# Patient Record
Sex: FEMALE | Race: WHITE | Employment: FULL TIME | URBAN - METROPOLITAN AREA
[De-identification: names, ages, dates, MRNs, and addresses within clinical notes are randomized per-mention and may not be internally consistent; named-entity substitution may affect disease eponyms.]

---

## 2016-12-05 LAB
ABO + RH BLD: NORMAL
ABO + RH BLD: NORMAL
BLD GP AB SCN SERPL QL: NORMAL
C TRACH DNA SPEC QL PROBE+SIG AMP: NEGATIVE
HBV SURFACE AG SERPL QL IA: NEGATIVE
HIV 1+2 AB+HIV1 P24 AG SERPL QL IA: NEGATIVE
N GONORRHOEA DNA SPEC QL PROBE+SIG AMP: NEGATIVE
PLATELET # BLD AUTO: 240 10^9/L
RUBELLA ANTIBODY IGG QUANTITATIVE: NORMAL IU/ML
T PALLIDUM IGG SER QL: NEGATIVE

## 2017-06-17 LAB — GROUP B STREP PCR: NORMAL

## 2017-07-12 ENCOUNTER — HOSPITAL ENCOUNTER (INPATIENT)
Facility: CLINIC | Age: 27
LOS: 2 days | Discharge: HOME | End: 2017-07-14
Attending: MIDWIFE | Admitting: MIDWIFE
Payer: COMMERCIAL

## 2017-07-12 LAB
ABO + RH BLD: NORMAL
ABO + RH BLD: NORMAL
BASOPHILS # BLD AUTO: 0 10E9/L (ref 0–0.2)
BASOPHILS NFR BLD AUTO: 0.3 %
DIFFERENTIAL METHOD BLD: ABNORMAL
EOSINOPHIL # BLD AUTO: 0.1 10E9/L (ref 0–0.7)
EOSINOPHIL NFR BLD AUTO: 0.7 %
ERYTHROCYTE [DISTWIDTH] IN BLOOD BY AUTOMATED COUNT: 13.4 % (ref 10–15)
HCT VFR BLD AUTO: 34.5 % (ref 35–47)
HGB BLD-MCNC: 11.1 G/DL (ref 11.7–15.7)
IMM GRANULOCYTES # BLD: 0 10E9/L (ref 0–0.4)
IMM GRANULOCYTES NFR BLD: 0.4 %
LYMPHOCYTES # BLD AUTO: 1.6 10E9/L (ref 0.8–5.3)
LYMPHOCYTES NFR BLD AUTO: 22.9 %
MCH RBC QN AUTO: 28.1 PG (ref 26.5–33)
MCHC RBC AUTO-ENTMCNC: 32.2 G/DL (ref 31.5–36.5)
MCV RBC AUTO: 87 FL (ref 78–100)
MONOCYTES # BLD AUTO: 0.6 10E9/L (ref 0–1.3)
MONOCYTES NFR BLD AUTO: 8.1 %
NEUTROPHILS # BLD AUTO: 4.9 10E9/L (ref 1.6–8.3)
NEUTROPHILS NFR BLD AUTO: 67.6 %
NRBC # BLD AUTO: 0 10*3/UL
NRBC BLD AUTO-RTO: 0 /100
PLATELET # BLD AUTO: 238 10E9/L (ref 150–450)
RBC # BLD AUTO: 3.95 10E12/L (ref 3.8–5.2)
SPECIMEN EXP DATE BLD: NORMAL
T PALLIDUM IGG+IGM SER QL: NEGATIVE
WBC # BLD AUTO: 7.2 10E9/L (ref 4–11)

## 2017-07-12 PROCEDURE — 85025 COMPLETE CBC W/AUTO DIFF WBC: CPT | Performed by: MIDWIFE

## 2017-07-12 PROCEDURE — 25000125 ZZHC RX 250: Performed by: MIDWIFE

## 2017-07-12 PROCEDURE — 12000037 ZZH R&B POSTPARTUM INTERMEDIATE

## 2017-07-12 PROCEDURE — 25000128 H RX IP 250 OP 636

## 2017-07-12 PROCEDURE — 72200001 ZZH LABOR CARE VAGINAL DELIVERY SINGLE

## 2017-07-12 PROCEDURE — 86901 BLOOD TYPING SEROLOGIC RH(D): CPT | Performed by: MIDWIFE

## 2017-07-12 PROCEDURE — 10907ZC DRAINAGE OF AMNIOTIC FLUID, THERAPEUTIC FROM PRODUCTS OF CONCEPTION, VIA NATURAL OR ARTIFICIAL OPENING: ICD-10-PCS | Performed by: MIDWIFE

## 2017-07-12 PROCEDURE — 25000132 ZZH RX MED GY IP 250 OP 250 PS 637: Performed by: MIDWIFE

## 2017-07-12 PROCEDURE — 86900 BLOOD TYPING SEROLOGIC ABO: CPT | Performed by: MIDWIFE

## 2017-07-12 PROCEDURE — 86780 TREPONEMA PALLIDUM: CPT | Performed by: MIDWIFE

## 2017-07-12 PROCEDURE — 25000128 H RX IP 250 OP 636: Performed by: MIDWIFE

## 2017-07-12 RX ORDER — IBUPROFEN 800 MG/1
800 TABLET, FILM COATED ORAL
Status: COMPLETED | OUTPATIENT
Start: 2017-07-12 | End: 2017-07-12

## 2017-07-12 RX ORDER — MISOPROSTOL 200 UG/1
400 TABLET ORAL
Status: DISCONTINUED | OUTPATIENT
Start: 2017-07-12 | End: 2017-07-14 | Stop reason: HOSPADM

## 2017-07-12 RX ORDER — ACETAMINOPHEN 325 MG/1
650 TABLET ORAL EVERY 4 HOURS PRN
Status: DISCONTINUED | OUTPATIENT
Start: 2017-07-12 | End: 2017-07-14 | Stop reason: HOSPADM

## 2017-07-12 RX ORDER — OXYTOCIN 10 [USP'U]/ML
10 INJECTION, SOLUTION INTRAMUSCULAR; INTRAVENOUS
Status: DISCONTINUED | OUTPATIENT
Start: 2017-07-12 | End: 2017-07-14 | Stop reason: HOSPADM

## 2017-07-12 RX ORDER — IBUPROFEN 400 MG/1
400-800 TABLET, FILM COATED ORAL EVERY 6 HOURS PRN
Status: DISCONTINUED | OUTPATIENT
Start: 2017-07-12 | End: 2017-07-14 | Stop reason: HOSPADM

## 2017-07-12 RX ORDER — OXYTOCIN/0.9 % SODIUM CHLORIDE 30/500 ML
340 PLASTIC BAG, INJECTION (ML) INTRAVENOUS CONTINUOUS PRN
Status: DISCONTINUED | OUTPATIENT
Start: 2017-07-12 | End: 2017-07-14 | Stop reason: HOSPADM

## 2017-07-12 RX ORDER — BISACODYL 10 MG
10 SUPPOSITORY, RECTAL RECTAL DAILY PRN
Status: DISCONTINUED | OUTPATIENT
Start: 2017-07-14 | End: 2017-07-14 | Stop reason: HOSPADM

## 2017-07-12 RX ORDER — CARBOPROST TROMETHAMINE 250 UG/ML
250 INJECTION, SOLUTION INTRAMUSCULAR
Status: DISCONTINUED | OUTPATIENT
Start: 2017-07-12 | End: 2017-07-12

## 2017-07-12 RX ORDER — OXYCODONE AND ACETAMINOPHEN 5; 325 MG/1; MG/1
1 TABLET ORAL
Status: DISCONTINUED | OUTPATIENT
Start: 2017-07-12 | End: 2017-07-12

## 2017-07-12 RX ORDER — PENICILLIN G POTASSIUM 5000000 [IU]/1
INJECTION, POWDER, FOR SOLUTION INTRAMUSCULAR; INTRAVENOUS
Status: COMPLETED
Start: 2017-07-12 | End: 2017-07-12

## 2017-07-12 RX ORDER — OXYTOCIN 10 [USP'U]/ML
10 INJECTION, SOLUTION INTRAMUSCULAR; INTRAVENOUS
Status: DISCONTINUED | OUTPATIENT
Start: 2017-07-12 | End: 2017-07-12

## 2017-07-12 RX ORDER — PENICILLIN G POTASSIUM 5000000 [IU]/1
5 INJECTION, POWDER, FOR SOLUTION INTRAMUSCULAR; INTRAVENOUS ONCE
Status: COMPLETED | OUTPATIENT
Start: 2017-07-12 | End: 2017-07-12

## 2017-07-12 RX ORDER — AMOXICILLIN 250 MG
1-2 CAPSULE ORAL 2 TIMES DAILY
Status: DISCONTINUED | OUTPATIENT
Start: 2017-07-12 | End: 2017-07-14 | Stop reason: HOSPADM

## 2017-07-12 RX ORDER — OXYTOCIN/0.9 % SODIUM CHLORIDE 30/500 ML
100 PLASTIC BAG, INJECTION (ML) INTRAVENOUS CONTINUOUS
Status: DISCONTINUED | OUTPATIENT
Start: 2017-07-12 | End: 2017-07-14 | Stop reason: HOSPADM

## 2017-07-12 RX ORDER — HYDROCORTISONE 2.5 %
CREAM (GRAM) TOPICAL 3 TIMES DAILY PRN
Status: DISCONTINUED | OUTPATIENT
Start: 2017-07-12 | End: 2017-07-14 | Stop reason: HOSPADM

## 2017-07-12 RX ORDER — METHYLERGONOVINE MALEATE 0.2 MG/ML
200 INJECTION INTRAVENOUS
Status: DISCONTINUED | OUTPATIENT
Start: 2017-07-12 | End: 2017-07-12

## 2017-07-12 RX ORDER — LANOLIN 100 %
OINTMENT (GRAM) TOPICAL
Status: DISCONTINUED | OUTPATIENT
Start: 2017-07-12 | End: 2017-07-14 | Stop reason: HOSPADM

## 2017-07-12 RX ORDER — OXYTOCIN/0.9 % SODIUM CHLORIDE 30/500 ML
100-340 PLASTIC BAG, INJECTION (ML) INTRAVENOUS CONTINUOUS PRN
Status: COMPLETED | OUTPATIENT
Start: 2017-07-12 | End: 2017-07-12

## 2017-07-12 RX ORDER — NALOXONE HYDROCHLORIDE 0.4 MG/ML
.1-.4 INJECTION, SOLUTION INTRAMUSCULAR; INTRAVENOUS; SUBCUTANEOUS
Status: DISCONTINUED | OUTPATIENT
Start: 2017-07-12 | End: 2017-07-14 | Stop reason: HOSPADM

## 2017-07-12 RX ORDER — SODIUM CHLORIDE, SODIUM LACTATE, POTASSIUM CHLORIDE, CALCIUM CHLORIDE 600; 310; 30; 20 MG/100ML; MG/100ML; MG/100ML; MG/100ML
INJECTION, SOLUTION INTRAVENOUS CONTINUOUS
Status: DISCONTINUED | OUTPATIENT
Start: 2017-07-12 | End: 2017-07-12

## 2017-07-12 RX ORDER — ACETAMINOPHEN 325 MG/1
650 TABLET ORAL EVERY 4 HOURS PRN
Status: DISCONTINUED | OUTPATIENT
Start: 2017-07-12 | End: 2017-07-12

## 2017-07-12 RX ORDER — ONDANSETRON 2 MG/ML
4 INJECTION INTRAMUSCULAR; INTRAVENOUS EVERY 6 HOURS PRN
Status: DISCONTINUED | OUTPATIENT
Start: 2017-07-12 | End: 2017-07-12

## 2017-07-12 RX ORDER — NALOXONE HYDROCHLORIDE 0.4 MG/ML
.1-.4 INJECTION, SOLUTION INTRAMUSCULAR; INTRAVENOUS; SUBCUTANEOUS
Status: DISCONTINUED | OUTPATIENT
Start: 2017-07-12 | End: 2017-07-12

## 2017-07-12 RX ORDER — FENTANYL CITRATE 50 UG/ML
50-100 INJECTION, SOLUTION INTRAMUSCULAR; INTRAVENOUS
Status: DISCONTINUED | OUTPATIENT
Start: 2017-07-12 | End: 2017-07-12

## 2017-07-12 RX ORDER — OXYCODONE AND ACETAMINOPHEN 5; 325 MG/1; MG/1
1 TABLET ORAL EVERY 4 HOURS PRN
Status: DISCONTINUED | OUTPATIENT
Start: 2017-07-12 | End: 2017-07-14 | Stop reason: HOSPADM

## 2017-07-12 RX ADMIN — ACETAMINOPHEN 650 MG: 325 TABLET, FILM COATED ORAL at 19:50

## 2017-07-12 RX ADMIN — PENICILLIN G POTASSIUM 5 MILLION UNITS: 5000000 POWDER, FOR SOLUTION INTRAMUSCULAR; INTRAPLEURAL; INTRATHECAL; INTRAVENOUS at 10:16

## 2017-07-12 RX ADMIN — Medication 340 ML/HR: at 18:36

## 2017-07-12 RX ADMIN — PENICILLIN G POTASSIUM 5 MILLION UNITS: 5000000 INJECTION, POWDER, FOR SOLUTION INTRAMUSCULAR; INTRAVENOUS at 10:16

## 2017-07-12 RX ADMIN — SENNOSIDES AND DOCUSATE SODIUM 1 TABLET: 8.6; 5 TABLET ORAL at 22:47

## 2017-07-12 RX ADMIN — IBUPROFEN 800 MG: 800 TABLET ORAL at 19:50

## 2017-07-12 RX ADMIN — SODIUM CHLORIDE, POTASSIUM CHLORIDE, SODIUM LACTATE AND CALCIUM CHLORIDE: 600; 310; 30; 20 INJECTION, SOLUTION INTRAVENOUS at 10:16

## 2017-07-12 RX ADMIN — SODIUM CHLORIDE 2.5 MILLION UNITS: 9 INJECTION, SOLUTION INTRAVENOUS at 14:00

## 2017-07-12 NOTE — PROGRESS NOTES
"OB Progress Note  2017  4:55 PM    S:  Not really feeling contractions, desires AROM.      O:  /73  Pulse 74  Temp 97.8  F (36.6  C) (Temporal)  Resp 16  Ht 1.651 m (5' 5\")  Wt 69.4 kg (153 lb)  BMI 25.46 kg/m2  EFM: baseline 145, accelerations pos, neg decelerations, mod variability; Category I  Aguilar:  Ctx q2-3 min, 60sec, palp mod  SVE:  8/80%/-1  Membranes: AROM clear fluid    A/P:  27 year old  @39w6d admitted with advanced dilation, active labor, GBS pos, CAT I  1.  AROM clear fluid  2.  Adequately tx for GBS  3.  Anticipate     Lola Fam CNM    "

## 2017-07-12 NOTE — PLAN OF CARE
Pt ambulated s difficulty to room 229  et side. Pt continues to deny any pain. Pt is off the monitor at this time. . Pt orientated to room denies any questions.

## 2017-07-12 NOTE — OP NOTE
OB Delivery Summary    1.  with  IUP @ 39w6d  2.  Pregnancy complications- GBS pos, normal course  3.  Labor- admitted with advanced dilation @ /-1, plan included AROM after being adequately treated for GBS. Membranes had been swept in clinic before admit, pt contracted irregularly from about 1100 until AROM at 1700. SVE at AROM 8/-1  4   Analgesia none  5.  Fetal heart tones-140 baseline, mod variability, early decelerations with pushing, return to baseline after contraction.  6.  Labor interventions- IV antibiotics for GBS x 2.  7.  Membranes-AROM @ 1700 on 17  8.   Infant- viable, vigorous male, vertex delivered at 1824 on 2017, Apgars 8 and 8 at one and five minutes.  Weight 9 pounds 11oz   9.   Placenta- delivered spontaneously at 1834, in tact with 3V cord   10.  Lacerations- very superficial lac at perineum, hemostatic, no sutures needed  11.  Complications- none  12.  EBL- 250cc  13.  Labor course:  1st stage one hour 17 min                                  2nd stage 7min                                   3rd stage 10min    Lola Fam  2017  6:50 PM

## 2017-07-12 NOTE — PROGRESS NOTES
Patient presents to [facility] with direct admit for advanced dilation and GBS+ status. Patient has a history of fast labors. She is currently having contractions, but not feeling them. +FM, Category one strip with accelerations of 15x15 and no decelerations.

## 2017-07-12 NOTE — IP AVS SNAPSHOT
[facility]    [address] [place] [address], [address]    [facility] [address]    Phone:  [phone]                                       After Visit Summary   7/12/2017    Анна Gore    MRN: [id number]           After Visit Summary Signature Page     I have received my discharge instructions, and my questions have been answered. I have discussed any challenges I see with this plan with the nurse or doctor.    ..........................................................................................................................................  Patient/Patient Representative Signature      ..........................................................................................................................................  Patient Representative Print Name and Relationship to Patient    ..................................................               ................................................  Date                                            Time    ..........................................................................................................................................  Reviewed by Signature/Title    ...................................................              ..............................................  Date                                                            Time

## 2017-07-12 NOTE — H&P
"OB Brief Admit H&P    No significant change in general health status based on examination of the patient, review of Nursing Admission Database and prenatal record.    Pt is a 27 year old  @ 39w6d who presented to L&D with advanced dilation from clinic. Pos FM, denies SROM, reports irregular contractions    Patient's prenatal course has been complicated by GBS pos, otherwise normal prenatal course. Hx of rapid delivery with first baby    Prenatal Labs:    Blood type O+  Rubella pending  GCT WNL  GBS pos    EFW: 7 1/2lbs    Resp 18  Ht 1.651 m (5' 5\")  Wt 69.4 kg (153 lb)  BMI 25.46 kg/m2  EFM:  155  [facility]: irregular contraction  SVE: 7/70%/-1  Membranes:  intact    Assessment:  27 year old  @ 39w6d admitted for advanced dilation, CAT I, GBS pos    Plan:  1. Admit to labor and delivery   2. Penicillin per protocol for GBS+  3. Plan to AROM after adequate tx for GBS  4 Mtahew Fam  2017  11:57 AM      "

## 2017-07-12 NOTE — IP AVS SNAPSHOT
MRN:[id number]                      After Visit Summary   7/12/2017    Анна Gore    MRN: [id number]           Thank you!     Thank you for choosing [facility] for your care. Our goal is always to provide you with excellent care. Hearing back from our patients is one way we can continue to improve our services. Please take a few minutes to complete the written survey that you may receive in the mail after you visit with us. Thank you!        Patient Information     Date Of Birth          1990        About your hospital stay     You were admitted on:  July 12, 2017 You last received care in the:   [facility]    You were discharged on:  July 14, 2017       Who to Call     For medical emergencies, please call 911.  For non-urgent questions about your medical care, please call your primary care provider or clinic, [phone]          Attending Provider     Provider Specialty    Lola Fam CNM [facility]       Primary Care Provider Office Phone # Fax #    Du Christian [phone]      After Care Instructions     Activity       Review discharge instructions            Diet       Resume previous diet            Discharge Instructions - Postpartum visit       Schedule postpartum visit with your provider and return to clinic in 6 weeks.                  Further instructions from your care team       Postpartum Vaginal Delivery Instructions    Activity       Ask family and friends for help when you need it.    Do not place anything in your vagina for 6 weeks.    You are not restricted on other activities, but take it easy for a few weeks to allow your body to recover from delivery.  You are able to do any activities you feel up to that point.    No driving until you have stopped taking your pain medications (usually two weeks after delivery).     Call your health care provider if you have any of these symptoms:       Increased pain, swelling, redness, or fluid around  "your stiches from an episiotomy or perineal tear.    A fever above 100.4 F (38 C) with or without chills when placing a thermometer under your tongue.    You soak a sanitary pad with blood within 1 hour, or you see blood clots larger than a golf ball.    Bleeding that lasts more than 6 weeks.    Vaginal discharge that smells bad.    Severe pain, cramping or tenderness in your lower belly area.    A need to urinate more frequently (use the toilet more often), more urgently (use the toilet very quickly), or it burns when you urinate.    Nausea and vomiting.    Redness, swelling or pain around a vein in your leg.    Problems breastfeeding or a red or painful area on your breast.    Chest pain and cough or are gasping for air.    Problems coping with sadness, anxiety, or depression.  If you have any concerns about hurting yourself or the baby, call your provider immediately.     You have questions or concerns after you return home.     Keep your hands clean:  Always wash your hands before touching your perineal area and stitches.  This helps reduce your risk of infection.  If your hands aren't dirty, you may use an alcohol hand-rub to clean your hands. Keep your nails clean and short.        Pending Results     No orders found from 7/10/2017 to 7/13/2017.            Statement of Approval     Ordered          07/14/17 0815  I have reviewed and agree with all the recommendations and orders detailed in this document.  EFFECTIVE NOW     Approved and electronically signed by:  Tess Guadalupe APRN CNM             Admission Information     Date & Time Provider Department Dept. Phone    7/12/2017 Lola Fam CNM  [facility] [phone]      Your Vitals Were     Blood Pressure Pulse Temperature Respirations Height Weight    117/72 71 98.2  F (36.8  C) (Oral) 18 1.651 m (5' 5\") 69.4 kg (153 lb)    BMI (Body Mass Index)                   25.46 kg/m2           MyChart Information     [employer] gives you secure " access to your electronic health record. If you see a primary care provider, you can also send messages to your care team and make appointments. If you have questions, please call your primary care clinic.  If you do not have a primary care provider, please call [phone] and they will assist you.        Care EveryWhere ID     This is your Care EveryWhere ID. This could be used by other organizations to access your [facility] medical records  [id number]        Equal Access to Services     JENNYFER PATTEN : Hadii darrin york hadasho Soomaali, waaxda luqadaha, qaybta kaalmada adeegyada, taniya royal. So [facility] [phone].    ATENCIÓN: Si anselmo jerry, tiene a lanier disposición servicios gratuitos de asistencia lingüística. Llame al [phone].    We comply with applicable federal civil rights laws and [place] laws. We do not discriminate on the basis of race, color, national origin, age, disability sex, sexual orientation or gender identity.               Review of your medicines      START taking        Dose / Directions    breast pump Misc        Electric pump   Quantity:  1 each   Refills:  0       ibuprofen 200 MG tablet   Commonly known as:  ADVIL/MOTRIN        Dose:  600 mg   Take 3 tablets (600 mg) by mouth every 6 hours as needed for other (cramping)   Refills:  0            Where to get your medicines      Some of these will need a paper prescription and others can be bought over the counter. Ask your nurse if you have questions.     Bring a paper prescription for each of these medications     breast pump Misc       You don't need a prescription for these medications     ibuprofen 200 MG tablet                Protect others around you: Learn how to safely use, store and throw away your medicines at www.disposemymeds.org.             Medication List: This is a list of all your medications and when to take them. Check marks below indicate your daily home schedule. Keep this list as a  reference.      Medications           Morning Afternoon Evening Bedtime As Needed    breast pump Misc   Electric pump                                ibuprofen 200 MG tablet   Commonly known as:  ADVIL/MOTRIN   Take 3 tablets (600 mg) by mouth every 6 hours as needed for other (cramping)   Last time this was given:  800 mg on 7/14/2017  3:16 AM

## 2017-07-13 LAB — HGB BLD-MCNC: 10.1 G/DL (ref 11.7–15.7)

## 2017-07-13 PROCEDURE — 12000037 ZZH R&B POSTPARTUM INTERMEDIATE

## 2017-07-13 PROCEDURE — 36415 COLL VENOUS BLD VENIPUNCTURE: CPT | Performed by: MIDWIFE

## 2017-07-13 PROCEDURE — 85018 HEMOGLOBIN: CPT | Performed by: MIDWIFE

## 2017-07-13 PROCEDURE — 25000132 ZZH RX MED GY IP 250 OP 250 PS 637: Performed by: MIDWIFE

## 2017-07-13 RX ADMIN — IBUPROFEN 800 MG: 400 TABLET ORAL at 21:19

## 2017-07-13 RX ADMIN — ACETAMINOPHEN 650 MG: 325 TABLET, FILM COATED ORAL at 02:36

## 2017-07-13 RX ADMIN — IBUPROFEN 800 MG: 400 TABLET ORAL at 02:37

## 2017-07-13 RX ADMIN — SENNOSIDES AND DOCUSATE SODIUM 1 TABLET: 8.6; 5 TABLET ORAL at 08:10

## 2017-07-13 RX ADMIN — IBUPROFEN 800 MG: 400 TABLET ORAL at 08:10

## 2017-07-13 RX ADMIN — IBUPROFEN 800 MG: 400 TABLET ORAL at 14:27

## 2017-07-13 RX ADMIN — ACETAMINOPHEN 650 MG: 325 TABLET, FILM COATED ORAL at 08:10

## 2017-07-13 RX ADMIN — SENNOSIDES AND DOCUSATE SODIUM 1 TABLET: 8.6; 5 TABLET ORAL at 21:20

## 2017-07-13 NOTE — LACTATION NOTE
Initial Lactation visit. Hand out given. Recommend unlimited, frequent breast feedings: At least 8 - 12 times every 24 hours. Avoid pacifiers and supplementation with formula unless medically indicated. Explained benefits of holding baby skin on skin to help promote better breastfeeding outcomes. Анна has no questions at this time, feels feedings are starting off well.  Will revisit as needed.    Jemima Reyes RN, IBCLC

## 2017-07-13 NOTE — PLAN OF CARE
Problem: Goal Outcome Summary  Goal: Goal Outcome Summary  Outcome: Improving  Vital signs stable. Patient voiding without difficulty. Able to ambulate in room free of dizziness. Taking tylenol/ibuprofen for pain management. Working on breastfeeding infant every 2-3 hours. Encouraged to call with questions/concerns. Will continue to monitor.

## 2017-07-13 NOTE — PROGRESS NOTES
"[facility]   Post-Partum Progress Note        Interval History:   Doing well.  Pain is adequately controlled.  No fevers.  No history of foul-smelling vaginal discharge.  Good appetite.  Denies chest pain, shortness of breath, nausea or vomiting.  Vaginal bleeding is similar to a heavy menstrual flow.  Breastfeeding well.            Medications:   All medications related to the patient's surgery have been reviewed          Physical Exam:   All vitals stable  Blood pressure 110/62, pulse 71, temperature 98.1  F (36.7  C), temperature source Oral, resp. rate 16, height 1.651 m (5' 5\"), weight 69.4 kg (153 lb), unknown if currently breastfeeding.  Uterine fundus is firm, non-tender and at the level of the umbilicus  Extremities - non-tender, non-edematous          Data:     Hemoglobin   Date Value Ref Range Status   07/12/2017 11.1 (L) 11.7 - 15.7 g/dL Final            Assessment and Plan:    Assessment:   Post-partum day #1  Normal spontaneous vaginal delivery  :     Doing well.   Plan:   Anticipate discharge tomorrow     Tess Guadalupe CNM  "

## 2017-07-13 NOTE — PLAN OF CARE
Problem: Goal Outcome Summary  Goal: Goal Outcome Summary  Outcome: Improving  VSS. Voiding without difficulty. Taking ibuprofen for discomfort. FFU/1. Breastfeeding well. Will continue to monitor.

## 2017-07-13 NOTE — PLAN OF CARE
Problem: Goal Outcome Summary  Goal: Goal Outcome Summary  Outcome: Improving  VSS.  Pain well controlled, requesting prn pain medications as needed.  Up independently in room.  Working on breastfeeding  and  cares. Voiding well.  Educated on safety protocol and unit routine. On pathway. Continue to monitor and notify MD as needed.

## 2017-07-13 NOTE — PLAN OF CARE
Problem: Individualization  Goal: Patient Preferences  Outcome: Improving  Patient's VSS.  Patient admitted for advanced dilation.  At 1650 patient AROM by Sarwat.  Patient coped very well with labor and at 1817 patient was complete and delivered healthy baby boy at 1824.  No tear noted.

## 2017-07-14 VITALS
SYSTOLIC BLOOD PRESSURE: 107 MMHG | WEIGHT: 153 LBS | RESPIRATION RATE: 18 BRPM | HEART RATE: 71 BPM | HEIGHT: 65 IN | BODY MASS INDEX: 25.49 KG/M2 | TEMPERATURE: 97.5 F | DIASTOLIC BLOOD PRESSURE: 79 MMHG

## 2017-07-14 PROCEDURE — 25000132 ZZH RX MED GY IP 250 OP 250 PS 637: Performed by: MIDWIFE

## 2017-07-14 RX ORDER — BREAST PUMP
EACH MISCELLANEOUS
Qty: 1 EACH | Refills: 0 | Status: ON HOLD | OUTPATIENT
Start: 2017-07-14 | End: 2020-11-13

## 2017-07-14 RX ORDER — IBUPROFEN 200 MG
600 TABLET ORAL EVERY 6 HOURS PRN
Status: ON HOLD | COMMUNITY
Start: 2017-07-14 | End: 2020-11-13

## 2017-07-14 RX ADMIN — IBUPROFEN 800 MG: 400 TABLET ORAL at 03:16

## 2017-07-14 RX ADMIN — SENNOSIDES AND DOCUSATE SODIUM 1 TABLET: 8.6; 5 TABLET ORAL at 09:33

## 2017-07-14 RX ADMIN — IBUPROFEN 800 MG: 400 TABLET ORAL at 09:33

## 2017-07-14 NOTE — PLAN OF CARE
Problem: Goal Outcome Summary  Goal: Goal Outcome Summary  Outcome: Improving  VSS, pain controlled with ibuprofen, independent with baby cares.  Reviewed discharge checklist.

## 2017-07-14 NOTE — PLAN OF CARE
Problem: Goal Outcome Summary  Goal: Goal Outcome Summary  Outcome: No Change  Vitally stable. Fundus firm, midline and U/1. Scant rubra lochia.  Denies pain well and controlled with ibuprofen. Adequate u/o. Breastfeeding every 2-3 hours. Up independently and making needs known.

## 2017-07-14 NOTE — PROGRESS NOTES
"[facility]   Post-Partum Progress Note        Interval History:   Doing well.  Pain is adequately controlled.  No fevers.  No history of foul-smelling vaginal discharge.  Good appetite.  Denies chest pain, shortness of breath, nausea or vomiting.  Vaginal bleeding is similar to a heavy menstrual flow.  Breastfeeding well.           Medications:   All medications related to the patient's surgery have been reviewed          Physical Exam:   All vitals stable  Blood pressure 117/72, pulse 71, temperature 98.2  F (36.8  C), temperature source Oral, resp. rate 18, height 1.651 m (5' 5\"), weight 69.4 kg (153 lb), unknown if currently breastfeeding.  Uterine fundus is firm, non-tender and at the level of the umbilicus  Extremities - Non-tender, non-edematous.        Data:     Hemoglobin   Date Value Ref Range Status   07/13/2017 10.1 (L) 11.7 - 15.7 g/dL Final   07/12/2017 11.1 (L) 11.7 - 15.7 g/dL Final            Assessment and Plan:    Assessment:   Post-partum day #2  Normal spontaneous vaginal delivery  :     Doing well.   Plan:   Discharge later today     Tess Guadalupe cnm  "

## 2017-07-14 NOTE — PLAN OF CARE
Problem: Goal Outcome Summary  Goal: Goal Outcome Summary  Outcome: Adequate for Discharge Date Met:  07/14/17  Pt doing well. Tylenol / Ibuprofen effective for minimal cramping / perineal discomfort. Breast feeding independently.  present and supportive. Plans for discharge today.

## 2017-08-21 NOTE — DISCHARGE INSTRUCTIONS

## 2017-10-07 ENCOUNTER — HEALTH MAINTENANCE LETTER (OUTPATIENT)
Age: 27
End: 2017-10-07

## 2019-11-03 ENCOUNTER — HEALTH MAINTENANCE LETTER (OUTPATIENT)
Age: 29
End: 2019-11-03

## 2020-05-11 LAB
HBV SURFACE AG SERPL QL IA: NEGATIVE
HIV 1+2 AB+HIV1 P24 AG SERPL QL IA: NEGATIVE
RUBELLA ANTIBODY IGG QUANTITATIVE: NORMAL IU/ML

## 2020-11-10 LAB — GROUP B STREP PCR: POSITIVE

## 2020-11-13 ENCOUNTER — HOSPITAL ENCOUNTER (INPATIENT)
Facility: CLINIC | Age: 30
LOS: 3 days | Discharge: HOME | End: 2020-11-16
Attending: MIDWIFE | Admitting: MIDWIFE
Payer: COMMERCIAL

## 2020-11-13 LAB
ABO + RH BLD: NORMAL
ABO + RH BLD: NORMAL
LABORATORY COMMENT REPORT: NORMAL
SARS-COV-2 RNA SPEC QL NAA+PROBE: NEGATIVE
SARS-COV-2 RNA SPEC QL NAA+PROBE: NORMAL
SPECIMEN EXP DATE BLD: NORMAL
SPECIMEN SOURCE: NORMAL
SPECIMEN SOURCE: NORMAL

## 2020-11-13 PROCEDURE — 86901 BLOOD TYPING SEROLOGIC RH(D): CPT | Performed by: MIDWIFE

## 2020-11-13 PROCEDURE — 36415 COLL VENOUS BLD VENIPUNCTURE: CPT | Performed by: MIDWIFE

## 2020-11-13 PROCEDURE — U0003 INFECTIOUS AGENT DETECTION BY NUCLEIC ACID (DNA OR RNA); SEVERE ACUTE RESPIRATORY SYNDROME CORONAVIRUS 2 (SARS-COV-2) (CORONAVIRUS DISEASE [COVID-19]), AMPLIFIED PROBE TECHNIQUE, MAKING USE OF HIGH THROUGHPUT TECHNOLOGIES AS DESCRIBED BY CMS-2020-01-R: HCPCS | Performed by: MIDWIFE

## 2020-11-13 PROCEDURE — 86900 BLOOD TYPING SEROLOGIC ABO: CPT | Performed by: MIDWIFE

## 2020-11-13 PROCEDURE — 120N000001 HC R&B MED SURG/OB

## 2020-11-13 PROCEDURE — 258N000003 HC RX IP 258 OP 636: Performed by: MIDWIFE

## 2020-11-13 PROCEDURE — 250N000011 HC RX IP 250 OP 636: Performed by: MIDWIFE

## 2020-11-13 PROCEDURE — 86780 TREPONEMA PALLIDUM: CPT | Performed by: MIDWIFE

## 2020-11-13 RX ORDER — SODIUM CHLORIDE, SODIUM LACTATE, POTASSIUM CHLORIDE, CALCIUM CHLORIDE 600; 310; 30; 20 MG/100ML; MG/100ML; MG/100ML; MG/100ML
INJECTION, SOLUTION INTRAVENOUS CONTINUOUS
Status: DISCONTINUED | OUTPATIENT
Start: 2020-11-13 | End: 2020-11-16 | Stop reason: HOSPADM

## 2020-11-13 RX ORDER — METHYLERGONOVINE MALEATE 0.2 MG/ML
200 INJECTION INTRAVENOUS
Status: DISCONTINUED | OUTPATIENT
Start: 2020-11-13 | End: 2020-11-16 | Stop reason: HOSPADM

## 2020-11-13 RX ORDER — ACETAMINOPHEN 325 MG/1
650 TABLET ORAL EVERY 4 HOURS PRN
Status: DISCONTINUED | OUTPATIENT
Start: 2020-11-13 | End: 2020-11-14

## 2020-11-13 RX ORDER — NALOXONE HYDROCHLORIDE 0.4 MG/ML
.1-.4 INJECTION, SOLUTION INTRAMUSCULAR; INTRAVENOUS; SUBCUTANEOUS
Status: DISCONTINUED | OUTPATIENT
Start: 2020-11-13 | End: 2020-11-16 | Stop reason: HOSPADM

## 2020-11-13 RX ORDER — BETAMETHASONE SODIUM PHOSPHATE AND BETAMETHASONE ACETATE 3; 3 MG/ML; MG/ML
12 INJECTION, SUSPENSION INTRA-ARTICULAR; INTRALESIONAL; INTRAMUSCULAR; SOFT TISSUE
Status: DISPENSED | OUTPATIENT
Start: 2020-11-13 | End: 2020-11-15

## 2020-11-13 RX ORDER — OXYTOCIN 10 [USP'U]/ML
10 INJECTION, SOLUTION INTRAMUSCULAR; INTRAVENOUS
Status: DISCONTINUED | OUTPATIENT
Start: 2020-11-13 | End: 2020-11-16 | Stop reason: HOSPADM

## 2020-11-13 RX ORDER — OXYTOCIN/0.9 % SODIUM CHLORIDE 30/500 ML
100-340 PLASTIC BAG, INJECTION (ML) INTRAVENOUS CONTINUOUS PRN
Status: COMPLETED | OUTPATIENT
Start: 2020-11-13 | End: 2020-11-14

## 2020-11-13 RX ORDER — OXYCODONE AND ACETAMINOPHEN 5; 325 MG/1; MG/1
1 TABLET ORAL
Status: COMPLETED | OUTPATIENT
Start: 2020-11-13 | End: 2020-11-15

## 2020-11-13 RX ORDER — PENICILLIN G POTASSIUM 5000000 [IU]/1
5 INJECTION, POWDER, FOR SOLUTION INTRAMUSCULAR; INTRAVENOUS ONCE
Status: COMPLETED | OUTPATIENT
Start: 2020-11-13 | End: 2020-11-13

## 2020-11-13 RX ORDER — PRENATAL VIT/IRON FUM/FOLIC AC 27MG-0.8MG
1 TABLET ORAL DAILY
COMMUNITY

## 2020-11-13 RX ORDER — ONDANSETRON 2 MG/ML
4 INJECTION INTRAMUSCULAR; INTRAVENOUS EVERY 6 HOURS PRN
Status: DISCONTINUED | OUTPATIENT
Start: 2020-11-13 | End: 2020-11-16 | Stop reason: HOSPADM

## 2020-11-13 RX ORDER — CARBOPROST TROMETHAMINE 250 UG/ML
250 INJECTION, SOLUTION INTRAMUSCULAR
Status: DISCONTINUED | OUTPATIENT
Start: 2020-11-13 | End: 2020-11-16 | Stop reason: HOSPADM

## 2020-11-13 RX ORDER — IBUPROFEN 400 MG/1
800 TABLET, FILM COATED ORAL
Status: DISCONTINUED | OUTPATIENT
Start: 2020-11-13 | End: 2020-11-16 | Stop reason: HOSPADM

## 2020-11-13 RX ORDER — TRANEXAMIC ACID 10 MG/ML
1 INJECTION, SOLUTION INTRAVENOUS EVERY 30 MIN PRN
Status: DISCONTINUED | OUTPATIENT
Start: 2020-11-13 | End: 2020-11-16 | Stop reason: HOSPADM

## 2020-11-13 RX ADMIN — SODIUM CHLORIDE, POTASSIUM CHLORIDE, SODIUM LACTATE AND CALCIUM CHLORIDE: 600; 310; 30; 20 INJECTION, SOLUTION INTRAVENOUS at 16:51

## 2020-11-13 RX ADMIN — PENICILLIN G POTASSIUM 5 MILLION UNITS: 5000000 POWDER, FOR SOLUTION INTRAMUSCULAR; INTRAPLEURAL; INTRATHECAL; INTRAVENOUS at 17:00

## 2020-11-13 RX ADMIN — SODIUM CHLORIDE 2.5 MILLION UNITS: 9 INJECTION, SOLUTION INTRAVENOUS at 20:45

## 2020-11-13 RX ADMIN — BETAMETHASONE SODIUM PHOSPHATE AND BETAMETHASONE ACETATE 12 MG: 3; 3 INJECTION, SUSPENSION INTRA-ARTICULAR; INTRALESIONAL; INTRAMUSCULAR at 17:00

## 2020-11-13 NOTE — PLAN OF CARE
Анна comes to L/D after being in the MD office and was found to be 7cm dilated. Currently she is 36+6. Her last two deliveries were rapid as well. Monitors applied with consent. Betamethasone 12 mg given in LVG. Penicillin 5mU given for +GBS.

## 2020-11-14 LAB — T PALLIDUM AB SER QL: NONREACTIVE

## 2020-11-14 PROCEDURE — 120N000012 HC R&B POSTPARTUM

## 2020-11-14 PROCEDURE — 10907ZC DRAINAGE OF AMNIOTIC FLUID, THERAPEUTIC FROM PRODUCTS OF CONCEPTION, VIA NATURAL OR ARTIFICIAL OPENING: ICD-10-PCS | Performed by: OBSTETRICS & GYNECOLOGY

## 2020-11-14 PROCEDURE — 250N000013 HC RX MED GY IP 250 OP 250 PS 637: Performed by: OBSTETRICS & GYNECOLOGY

## 2020-11-14 PROCEDURE — 250N000009 HC RX 250: Performed by: MIDWIFE

## 2020-11-14 PROCEDURE — 722N000001 HC LABOR CARE VAGINAL DELIVERY SINGLE

## 2020-11-14 RX ORDER — AMOXICILLIN 250 MG
2 CAPSULE ORAL 2 TIMES DAILY
Status: DISCONTINUED | OUTPATIENT
Start: 2020-11-14 | End: 2020-11-16 | Stop reason: HOSPADM

## 2020-11-14 RX ORDER — AMOXICILLIN 250 MG
1 CAPSULE ORAL 2 TIMES DAILY
Status: DISCONTINUED | OUTPATIENT
Start: 2020-11-14 | End: 2020-11-16 | Stop reason: HOSPADM

## 2020-11-14 RX ORDER — OXYTOCIN/0.9 % SODIUM CHLORIDE 30/500 ML
100 PLASTIC BAG, INJECTION (ML) INTRAVENOUS CONTINUOUS
Status: DISCONTINUED | OUTPATIENT
Start: 2020-11-14 | End: 2020-11-16 | Stop reason: HOSPADM

## 2020-11-14 RX ORDER — ACETAMINOPHEN 325 MG/1
650 TABLET ORAL EVERY 4 HOURS PRN
Status: DISCONTINUED | OUTPATIENT
Start: 2020-11-14 | End: 2020-11-16 | Stop reason: HOSPADM

## 2020-11-14 RX ORDER — OXYTOCIN 10 [USP'U]/ML
10 INJECTION, SOLUTION INTRAMUSCULAR; INTRAVENOUS
Status: DISCONTINUED | OUTPATIENT
Start: 2020-11-14 | End: 2020-11-16 | Stop reason: HOSPADM

## 2020-11-14 RX ORDER — IBUPROFEN 400 MG/1
800 TABLET, FILM COATED ORAL EVERY 6 HOURS PRN
Status: DISCONTINUED | OUTPATIENT
Start: 2020-11-14 | End: 2020-11-16 | Stop reason: HOSPADM

## 2020-11-14 RX ORDER — MODIFIED LANOLIN
OINTMENT (GRAM) TOPICAL
Status: DISCONTINUED | OUTPATIENT
Start: 2020-11-14 | End: 2020-11-16 | Stop reason: HOSPADM

## 2020-11-14 RX ORDER — OXYTOCIN/0.9 % SODIUM CHLORIDE 30/500 ML
340 PLASTIC BAG, INJECTION (ML) INTRAVENOUS CONTINUOUS PRN
Status: DISCONTINUED | OUTPATIENT
Start: 2020-11-14 | End: 2020-11-16 | Stop reason: HOSPADM

## 2020-11-14 RX ORDER — HYDROCORTISONE 2.5 %
CREAM (GRAM) TOPICAL 3 TIMES DAILY PRN
Status: DISCONTINUED | OUTPATIENT
Start: 2020-11-14 | End: 2020-11-16 | Stop reason: HOSPADM

## 2020-11-14 RX ORDER — NALOXONE HYDROCHLORIDE 0.4 MG/ML
.1-.4 INJECTION, SOLUTION INTRAMUSCULAR; INTRAVENOUS; SUBCUTANEOUS
Status: DISCONTINUED | OUTPATIENT
Start: 2020-11-14 | End: 2020-11-16 | Stop reason: HOSPADM

## 2020-11-14 RX ORDER — BISACODYL 10 MG
10 SUPPOSITORY, RECTAL RECTAL DAILY PRN
Status: DISCONTINUED | OUTPATIENT
Start: 2020-11-16 | End: 2020-11-16 | Stop reason: HOSPADM

## 2020-11-14 RX ORDER — TRANEXAMIC ACID 10 MG/ML
1 INJECTION, SOLUTION INTRAVENOUS EVERY 30 MIN PRN
Status: DISCONTINUED | OUTPATIENT
Start: 2020-11-14 | End: 2020-11-16 | Stop reason: HOSPADM

## 2020-11-14 RX ADMIN — DOCUSATE SODIUM 50 MG AND SENNOSIDES 8.6 MG 1 TABLET: 8.6; 5 TABLET, FILM COATED ORAL at 20:28

## 2020-11-14 RX ADMIN — IBUPROFEN 800 MG: 400 TABLET ORAL at 01:20

## 2020-11-14 RX ADMIN — ACETAMINOPHEN 650 MG: 325 TABLET, FILM COATED ORAL at 01:20

## 2020-11-14 RX ADMIN — Medication 340 ML/HR: at 00:56

## 2020-11-14 RX ADMIN — IBUPROFEN 800 MG: 400 TABLET ORAL at 21:23

## 2020-11-14 RX ADMIN — ACETAMINOPHEN 650 MG: 325 TABLET, FILM COATED ORAL at 07:24

## 2020-11-14 RX ADMIN — IBUPROFEN 800 MG: 400 TABLET ORAL at 07:24

## 2020-11-14 RX ADMIN — IBUPROFEN 800 MG: 400 TABLET ORAL at 14:51

## 2020-11-14 NOTE — PLAN OF CARE
Pt calls RN into room and states that since starting her antibiotics she is feeling uncomfortable with her contractions.  SVE done and no changes noted.  Continue to monitor.

## 2020-11-14 NOTE — LACTATION NOTE
"Initial visit with Анна. Baby Shyam was admitted to our NICU for respiratory distress within hours after delivery requiring CPAP. Анна did have a successful breast feeding session with infant after delivery. Since infant's admission to NICU, Анна has been using the Medela Symphony grade pump. Анна shares she has been pumping every 3 hours and has been collecting milk, Анна just finishes a pumping session and collects about 15ml of milk.  offers two handouts to Анна; 1) \"Milk Making Reminders\" includes recommended frequency of pumping, videos on hand expression, when to switch modes on Medela hospital grade pump and handout 2) shows milk production increases through day 14. Makeshift hands free pumping bra created/provided. Offered our lactation services to Анна once infant is off CPAP if she needs support with breastfeeding.    Encouraged to continue pumping every 3 hours, or a total of 8 pumping sessions in 24 hours. Once infant off CPAP and if he is successfully breastfeeding, suggested Анна would not need to continue to pump after breast feeding.     Анна shares she breast fed her first two babies for over a year.  Анна appreciative of visit.    Naila Holder, RN  Lactation Educator           "

## 2020-11-14 NOTE — H&P
OB Brief Admit H&P    No significant change in general health status based on examination of the patient, review of Nursing Admission Database and prenatal record.    Pt is a 30 year old  @ 36w6d who presented to L&D for advanced dilation.  Presented to clinic feeling rectal pressure and groin pain. SVE 7/80/-2  Consulted with DR Clemente, plan steroids and GBS prophylaxis. Reports good movement, does not perceive uterine contractions.  Denies leaking of fluid or vaginal bleeding.  Patient's prenatal course has been complicated by nothing.  Normal course. Has regular prenatal care with Kimberly Carrero and transferred to [facility] at 30 weeks. Patient has history of advanced dilation with second baby.  Induced with AROM at 40 weeks, cervix was 8cm and patient did not perceive contractions    Prenatal Labs:    Blood type O pos  Rubella Immune  GCT wnl  GBS positive    EFW: 8.5lbs    There were no vitals taken for this visit.  EFM:  145  [place]: UC's 2-3 palp mild  SVE: 7/80%/-2  Membranes:  intact    Assessment:  30 year old  @ 36w6d admitted for advanced dilation, Cat I, GBS positive    Plan:  1. Admit to labor and delivery   2. Betamethasone per protocol  3. Penicillin per protocol  4. After second dose of betamethasone consider GERMÁN Fam CNM  2020  6:21 PM

## 2020-11-14 NOTE — L&D DELIVERY NOTE
OB Vaginal Delivery Note      HPI:  Pt is a 30 year old  @ 37w0d who presented to L&D on 2020 for advance dilation without contractions.            Prenatal labs:  O+ antibody screen: negative, Rubella immune,  Hep B/HIV/RPR all negative, GC/CT negative, GCT WNL, GBS positive    Pregnancy complications:  H/o advanced dilation with her second pregnancy and rapid delivery. GBS positive    Hospital Course:    First Stage: On admission, contractions were every 2-3 minutes with mild contractions and patient was 80%/7/-2 dilated. FHTs were in the 1501 with accelerations present. moderate variability,  no decelerations noted.   Abdomen was non-tender.  EFW was 7 1/2# by Leopold's.  As the pt was 36 6/7 weeks she was given Betamethasone and was started on PCN. Patient's labor progress and the pt was 8 cm. At that point she was getting uncomfortable and request AROM.   She did not have anything for pain.  She did not receive pitocin.  AROM occurred at 0040 with clear fluid noted.  Patient reached complete cervical dilation at 0049 on 2020.    Second Stage:  Patient did not  labor down , and began pushing at 0049.  Good maternal expulsive efforts were noted.  Fetal heart tones remained reassuring during the second stage.  Baseline 135, with moderate variability, no decelerations noted.  She was able to bring the fetal vertex to a full crown.  The fetal vertex was then easily delivered, followed by the fetal shoulders without complications.  A  nuchal cord was not present.  A male infant was then delivered without complications at 0051 on 2020.  The mouth and nares were bulb suctioned.  The cord was clamped after it had stopped pulsating, cut and the infant was placed on the mother's abdomen.  The infant's weight was 7 pounds 7 ounces.  Apgars were 8 and 9 at one and five minutes .       Third Stage:  The placenta then delivered at 0056 .  It was noted to be intact with a three vessel cord.  The  patient's perineum was inspected and there were no tears .  QBL for the procedure was 100cc.    Sponge and needle counts were correct.  The patient and infant remained in the delivery suite following delivery in stable condition.    Deena Ortiz MD  11/14/2020  1:15 AM

## 2020-11-14 NOTE — PLAN OF CARE
Assessment and vital signs within normal limits.  Patient is up independently, voiding without difficulty, pain well controlled with medications given as prescribed. Taking tylenol and ibuprofen.  Mom is pumping every 3 hours and frequently visiting NICU to see infant.  Encouraged to continue to ambulate as able and void frequently.  Continue to monitor and notify MD as needed.

## 2020-11-14 NOTE — PLAN OF CARE
VSS.  Started pumping.  Tylenol and Ibuprofen controlling pain.  FF @ midline.  Voiding adequately.  Will continue to monitor.

## 2020-11-14 NOTE — PLAN OF CARE
Patient admitted from L&D via W/C with baby in arms at 0325.  ID bands double-checked with previous RN.  VSS.  FF at midline.  Oriented to unit and room.  Questions asked and answered.  Will continue to monitor.

## 2020-11-14 NOTE — PLAN OF CARE
Pt's pain controlled with Ibuprofen/Tylenol. Up and about in room and walks down to NICU to visit . Pumping breasts every three hours. Will continue to monitor.

## 2020-11-14 NOTE — PROGRESS NOTES
OB Post-partum Note  PPD# 0    S:  Patient doing well.  Pain controlled.  Voiding.  Bleeding scant.  Baby in NICU on cpap, but stable.  Patient coping well    O:  BP 97/62 (BP Location: Left arm)   Pulse 62   Temp 97.9  F (36.6  C) (Oral)   Resp 16   Gen- A&O, NAD  Abd- Non-tender, fundus firm at umbilicus  Ext- non-tender, neg edema    Hemoglobin   Date Value Ref Range Status   2017 10.1 (L) 11.7 - 15.7 g/dL Final     O+  Rubella Immune    A/P: 30 year old  PPD# 0 s/p     1.  Routine post-partum cares.  2.  Anticipate d/c home on PPD# 2.    Lola Fam CNM  2020  1:06 PM

## 2020-11-14 NOTE — PLAN OF CARE
Vital signs stable, fundus firm, U/1, scant rubra flow. Patient is up ad harinder, voiding adequately, pain well controlled with tylenol and ibuprofen. Parents are bonding well with infant. Breast fed well. Transferred to  rm [id number] via wheelchair, with infant in arms, accompanied by spouse. Report given to Ayana MARCANO RN

## 2020-11-15 LAB — HGB BLD-MCNC: 9.8 G/DL (ref 11.7–15.7)

## 2020-11-15 PROCEDURE — 85018 HEMOGLOBIN: CPT | Performed by: OBSTETRICS & GYNECOLOGY

## 2020-11-15 PROCEDURE — 36415 COLL VENOUS BLD VENIPUNCTURE: CPT | Performed by: OBSTETRICS & GYNECOLOGY

## 2020-11-15 PROCEDURE — 250N000013 HC RX MED GY IP 250 OP 250 PS 637: Performed by: MIDWIFE

## 2020-11-15 PROCEDURE — 250N000013 HC RX MED GY IP 250 OP 250 PS 637: Performed by: OBSTETRICS & GYNECOLOGY

## 2020-11-15 PROCEDURE — 120N000012 HC R&B POSTPARTUM

## 2020-11-15 RX ORDER — OXYCODONE HYDROCHLORIDE 5 MG/1
5 TABLET ORAL EVERY 4 HOURS PRN
Status: DISCONTINUED | OUTPATIENT
Start: 2020-11-15 | End: 2020-11-16 | Stop reason: HOSPADM

## 2020-11-15 RX ORDER — FERROUS SULFATE 325(65) MG
325 TABLET ORAL 2 TIMES DAILY
Status: DISCONTINUED | OUTPATIENT
Start: 2020-11-15 | End: 2020-11-16 | Stop reason: HOSPADM

## 2020-11-15 RX ADMIN — FERROUS SULFATE TAB 325 MG (65 MG ELEMENTAL FE) 325 MG: 325 (65 FE) TAB at 11:27

## 2020-11-15 RX ADMIN — IBUPROFEN 800 MG: 400 TABLET ORAL at 06:46

## 2020-11-15 RX ADMIN — DOCUSATE SODIUM 50 MG AND SENNOSIDES 8.6 MG 1 TABLET: 8.6; 5 TABLET, FILM COATED ORAL at 20:59

## 2020-11-15 RX ADMIN — ACETAMINOPHEN 650 MG: 325 TABLET, FILM COATED ORAL at 20:59

## 2020-11-15 RX ADMIN — OXYCODONE HYDROCHLORIDE 5 MG: 5 TABLET ORAL at 16:54

## 2020-11-15 RX ADMIN — IBUPROFEN 800 MG: 400 TABLET ORAL at 18:31

## 2020-11-15 RX ADMIN — IBUPROFEN 800 MG: 400 TABLET ORAL at 12:31

## 2020-11-15 RX ADMIN — ACETAMINOPHEN 650 MG: 325 TABLET, FILM COATED ORAL at 16:54

## 2020-11-15 RX ADMIN — FERROUS SULFATE TAB 325 MG (65 MG ELEMENTAL FE) 325 MG: 325 (65 FE) TAB at 20:59

## 2020-11-15 RX ADMIN — OXYCODONE HYDROCHLORIDE AND ACETAMINOPHEN 1 TABLET: 5; 325 TABLET ORAL at 11:27

## 2020-11-15 NOTE — PLAN OF CARE
VSS, fundus firm, scant flow, voiding adequately, ambulating independently. Breastfeeding infant in NICU, every three hours. Pain well controlled with tylenol and ibuprofen except for headache, taking oxycodone per request. Will continue to monitor.

## 2020-11-15 NOTE — PROGRESS NOTES
OB Post-partum Note  PPD# 1    S:  Patient doing well.  Pain controlled.  Voiding.  Bleeding scant.  Breast feeding well. Baby remains in NICU but off CPAP and doing well.  Pt has had BM.  Reports mild HA, not really helped with ibuprofen but has not tried tylenol. Denies visual changes, epigastric pain or RUQ pain.  Denies SOB or dizziness.    O:  /74   Pulse 79   Temp 97.2  F (36.2  C) (Oral)   Resp 16   Gen- A&O, NAD  Abd- Non-tender, fundus firm at umbilicus  Ext- non-tender, neg edema    Hemoglobin   Date Value Ref Range Status   11/15/2020 9.8 (L) 11.7 - 15.7 g/dL Final     O positive  Rubella Immune    A/P: 30 year old  PPD# 1 s/p , breastfeeding    1.  Routine post-partum cares.  2.  Start iron 325mg po BID  3.  Order for oxycodone for HA if tylenol and ibuprofen aren't effective  4.  Continue to monitor for signs of pre eclampsia  5.  Plan discharge home tomorrow    Lola Fam CNM, BERTHAM  11/15/2020  10:14 AM

## 2020-11-15 NOTE — PLAN OF CARE
Pt's headache controlled with Percocet and Ibuprofen. Walking down to NICU to visit . Pumping after feedings. Will continue to monitor.

## 2020-11-15 NOTE — PLAN OF CARE
Vital signs stable. Postpartum assessment WDL. Using Ibuprofen for pain. Up and ambulating; free of dizziness. Pumping for baby in NICU. P/t frequently in NICU throughout night. Questions/concerns addressed.

## 2020-11-16 ENCOUNTER — HEALTH MAINTENANCE LETTER (OUTPATIENT)
Age: 30
End: 2020-11-16

## 2020-11-16 VITALS
SYSTOLIC BLOOD PRESSURE: 110 MMHG | RESPIRATION RATE: 16 BRPM | DIASTOLIC BLOOD PRESSURE: 75 MMHG | TEMPERATURE: 97.7 F | HEART RATE: 81 BPM

## 2020-11-16 PROCEDURE — 250N000013 HC RX MED GY IP 250 OP 250 PS 637: Performed by: OBSTETRICS & GYNECOLOGY

## 2020-11-16 PROCEDURE — 250N000013 HC RX MED GY IP 250 OP 250 PS 637: Performed by: MIDWIFE

## 2020-11-16 RX ADMIN — FERROUS SULFATE TAB 325 MG (65 MG ELEMENTAL FE) 325 MG: 325 (65 FE) TAB at 09:49

## 2020-11-16 RX ADMIN — ACETAMINOPHEN 650 MG: 325 TABLET, FILM COATED ORAL at 00:51

## 2020-11-16 RX ADMIN — IBUPROFEN 800 MG: 400 TABLET ORAL at 12:44

## 2020-11-16 RX ADMIN — IBUPROFEN 800 MG: 400 TABLET ORAL at 00:52

## 2020-11-16 RX ADMIN — ACETAMINOPHEN 650 MG: 325 TABLET, FILM COATED ORAL at 12:44

## 2020-11-16 RX ADMIN — DOCUSATE SODIUM 50 MG AND SENNOSIDES 8.6 MG 1 TABLET: 8.6; 5 TABLET, FILM COATED ORAL at 09:49

## 2020-11-16 RX ADMIN — IBUPROFEN 800 MG: 400 TABLET ORAL at 06:23

## 2020-11-16 RX ADMIN — ACETAMINOPHEN 650 MG: 325 TABLET, FILM COATED ORAL at 06:23

## 2020-11-16 NOTE — LACTATION NOTE
Attempted Lactation visit prior to discharge. Анна in the shower. Checked in with primary RN, and per Анна, she declines need to see Lactation again this morning. Baby in NICU. Анна aware Lactation can visit in NICU as needed.    Lesvia Hsu RN-C, IBCLC, MNN, PHN, BSN

## 2020-11-16 NOTE — PLAN OF CARE
Pt's pain controlled with Ibuprofen/Tylenol. Up and about in room and walks down to NICU to visit . Going home today. Pumping prn.

## 2020-11-16 NOTE — PROGRESS NOTES
OB Post-partum Note  PPD# 2    S:  Patient doing well.  Pain controlled.  Voiding.  Bleeding scant.  Breast feeding well and pumping, baby remains in NICU but doing well.      O:  /72   Pulse 69   Temp 97.4  F (36.3  C) (Oral)   Resp 16   Gen- A&O, NAD  Abd- Non-tender, fundus firm at umbilicus  Ext- non-tender, neg edema    Hemoglobin   Date Value Ref Range Status   11/15/2020 9.8 (L) 11.7 - 15.7 g/dL Final     O+  Rubella Immune    A/P: 30 year old  PPD# 2 s/p , breastfeeding    Post partum teaching/warnings precautions reviewed.  RTC in 6 weeks/prn    Lola Fam CNM  2020  8:29 AM

## 2020-11-16 NOTE — PLAN OF CARE
Vital signs stable. Postpartum assessment WDL. Uterine fundus is firm and noted at U/-3. Scant lochia rubra. P/t reported headache that decreased throughout the day and feeling much better during night. Using Tylenol and Ibuprofen for pain. Up and ambulating; free of dizziness. Breastfeeding baby in NICU. Questions/concerns addressed.

## 2021-09-18 ENCOUNTER — HEALTH MAINTENANCE LETTER (OUTPATIENT)
Age: 31
End: 2021-09-18

## 2022-01-08 ENCOUNTER — HEALTH MAINTENANCE LETTER (OUTPATIENT)
Age: 32
End: 2022-01-08

## 2022-11-20 ENCOUNTER — HEALTH MAINTENANCE LETTER (OUTPATIENT)
Age: 32
End: 2022-11-20

## 2023-04-15 ENCOUNTER — HEALTH MAINTENANCE LETTER (OUTPATIENT)
Age: 33
End: 2023-04-15

## 2025-06-21 ENCOUNTER — HEALTH MAINTENANCE LETTER (OUTPATIENT)
Age: 35
End: 2025-06-21